# Patient Record
Sex: MALE | Race: BLACK OR AFRICAN AMERICAN
[De-identification: names, ages, dates, MRNs, and addresses within clinical notes are randomized per-mention and may not be internally consistent; named-entity substitution may affect disease eponyms.]

---

## 2018-05-26 ENCOUNTER — HOSPITAL ENCOUNTER (EMERGENCY)
Dept: HOSPITAL 41 - JD.ED | Age: 23
Discharge: HOME | End: 2018-05-26
Payer: SELF-PAY

## 2018-05-26 DIAGNOSIS — J02.9: Primary | ICD-10-CM

## 2018-05-26 DIAGNOSIS — F17.210: ICD-10-CM

## 2018-05-26 NOTE — EDM.PDOC
ED HPI GENERAL MEDICAL PROBLEM





- General


Chief Complaint: ENT Problem


Stated Complaint: LEFT EAR PAIN/SORE THROAT


Time Seen by Provider: 05/26/18 15:22


Source of Information: Reports: Patient


History Limitations: Reports: No Limitations





- History of Present Illness


INITIAL COMMENTS - FREE TEXT/NARRATIVE: 


Patient is a 22-year-old male presents ED complaining of mild sore throat, 

sinus congestion, left ear pain, tactile fever has been present for the past 5 

days. States symptoms are improving. Denies recent sick exposures. States he 

has postnasal drip Bria of the sore throat. Denies any seasonal allergies. 

Worsening all ricks 14 hours a day 30 days in a row. He has been eating and 

drinking okay. Denies any fever, nausea vomiting, abdominal pain, shows breath, 

chest pain, rash, or any additional complaints. He has no pertinent past 

medical history and currently taking no medications other than over-the-counter 

DayQuil and throat lozenges.


  ** Left Ear


Pain Score (Numeric/FACES): 7





- Related Data


 Allergies











Allergy/AdvReac Type Severity Reaction Status Date / Time


 


No Known Allergies Allergy   Verified 05/26/18 15:10











Home Meds: 


 Home Meds





. [No Known Home Meds]  05/26/18 [History]











Past Medical History





- Past Health History


Medical/Surgical History: Denies Medical/Surgical History





Social & Family History





- Family History


Family Medical History: Noncontributory





- Tobacco Use


Smoking Status *Q: Current Every Day Smoker


Years of Tobacco use: 2


Packs/Tins Daily: 0.5





- Caffeine Use


Caffeine Use: Reports: None





- Recreational Drug Use


Recreational Drug Use: No





ED ROS ENT





- Review of Systems


Review Of Systems: See Below


Constitutional: Denies: Fever, Chills, Malaise, Weakness, Decreased Appetite


HEENT: Reports: Ear Pain (Left), Throat Pain, Other (Post nasal drip).  Denies: 

Rhinitis, Sinus Problem, Throat Swelling


Respiratory: Reports: No Symptoms


Cardiovascular: Reports: No Symptoms


GI/Abdominal: Reports: No Symptoms


Musculoskeletal: Reports: No Symptoms


Skin: Reports: No Symptoms


Neurological: Reports: No Symptoms


Psychiatric: Reports: No Symptoms





ED EXAM, ENT





- Physical Exam


Exam: See Below


Exam Limited By: No Limitations


General Appearance: Alert, WD/WN, No Apparent Distress


Eye Exam: Bilateral Eye: Normal Inspection, PERRL


Ears: Normal External Exam, Normal Canal, Hearing Grossly Normal, Normal TMs


Nose: Normal Inspection


Mouth/Throat: Normal Inspection, Normal Gums, Normal Lips, Normal Teeth, 

Pharyngeal Erythema, Tonsillar Erythema.  No: Peritonsillar Mass, Throat 

Swelling, Tongue Swelling, Tonsillar Exudates, Tonsillar Swelling, Trismus, 

Uvular Deviation, Uvular Edema


Head: Atraumatic, Normocephalic


Neck: Normal Inspection, Supple, Full Range of Motion, Lymphadenopathy (L)


Respiratory/Chest: No Respiratory Distress, Lungs Clear, Normal Breath Sounds, 

No Accessory Muscle Use, Chest Non-Tender


Cardiovascular: Normal Peripheral Pulses, Regular Rate, Rhythm


GI/Abdominal: Normal Bowel Sounds, Soft, Non-Tender, No Organomegaly, No 

Distention


Back: Normal Inspection


Neurological: Alert, Oriented, CN II-XII Intact, Normal Cognition, No Motor/

Sensory Deficits


Psychiatric: Normal Affect, Normal Mood


Skin: Warm, Dry, Intact, Normal Color, No Rash





Course





- Vital Signs


Last Recorded V/S: 


 Last Vital Signs











Temp  98.6 F   05/26/18 15:08


 


Pulse  60   05/26/18 15:08


 


Resp  16   05/26/18 15:08


 


BP  139/87   05/26/18 15:08


 


Pulse Ox  98   05/26/18 15:08














- Orders/Labs/Meds


Orders: 


 Active Orders 24 hr











 Category Date Time Status


 


 Rapid Strep w/culture conf [STREP SCRN A RAPID W CULT Lab  05/26/18 15:15 

Results





 CONF] [RM] Stat   














- Re-Assessments/Exams


Free Text/Narrative Re-Assessment/Exam: 


Strep screen was obtained and came back negative. Patient has a viral upper 

respiratory infection. Has some postnasal drip with some mild erythema to the 

posterior pharynx with no swelling noted to the tonsils and or exudates. 

Symptomatic treatment is appropriate. He has no further questions concerns. 

Discharge instructions as documented.


0





Departure





- Departure


Time of Disposition: 15:52


Disposition: Home, Self-Care 01


Condition: Good


Clinical Impression: 


 Viral upper respiratory infection





Pharyngitis


Qualifiers:


 Pharyngitis/tonsillitis etiology: unspecified etiology Qualified Code(s): 

J02.9 - Acute pharyngitis, unspecified








- Discharge Information


Instructions:  Pharyngitis, Viral Respiratory Infection, Easy-To-Read


Referrals: 


PCP,Not In Area [Primary Care Provider] - 


Forms:  ED Department Discharge, ED Return to Work/School Form


Additional Instructions: 


Strep screen was negative. Suspect cause of current symptoms is viral and will 

run its course on its own improving over the next few days. Treatment is 

symptomatic care including: Flonase 1 spray to each naris twice a day. Nasal 

saline spray to each nares as needed throughout the course today. Tylenol and 

ibuprofen in alternating fashion for pain. Use Chloraseptic spray and/or 

lozenges for throat discomfort. Push the fluids. Ensure adequate rest. Follow-

up with a primary care provider this following week if symptoms persist. Return 

to the ED if you develop any new or worsening symptoms.  If strep culture comes 

back positive you be notified and started on an antibiotic.





- My Orders


Last 24 Hours: 


My Active Orders





05/26/18 15:15


Rapid Strep w/culture conf [STREP SCRN A RAPID W CULT CONF] [] Stat 














- Assessment/Plan


Last 24 Hours: 


My Active Orders





05/26/18 15:15


Rapid Strep w/culture conf [STREP SCRN A RAPID W CULT CONF] [RM] Stat